# Patient Record
Sex: MALE | Race: BLACK OR AFRICAN AMERICAN
[De-identification: names, ages, dates, MRNs, and addresses within clinical notes are randomized per-mention and may not be internally consistent; named-entity substitution may affect disease eponyms.]

---

## 2018-08-27 ENCOUNTER — HOSPITAL ENCOUNTER (OUTPATIENT)
Dept: HOSPITAL 62 - RAD | Age: 63
End: 2018-08-27
Attending: ORTHOPAEDIC SURGERY
Payer: MEDICARE

## 2018-08-27 DIAGNOSIS — M25.511: Primary | ICD-10-CM

## 2018-08-27 DIAGNOSIS — M75.111: ICD-10-CM

## 2018-08-27 NOTE — RADIOLOGY REPORT (SQ)
EXAM DESCRIPTION:  MRI RT UPPER JOINT WITHOUT



COMPLETED DATE/TIME:  8/27/2018 8:39 am



REASON FOR STUDY:  PAIN IN R SHOULDER M25.511  PAIN IN RIGHT SHOULDER



COMPARISON:  None.



TECHNIQUE:  Right shoulder images acquired and stored on PACS. Multiplanar imaging to include fat sen
sitive sequences such as T1, water sensitive sequences such as FST2/STIR, cartilage sensitive sequenc
es such as FSPD/gradient-echo sequences.



LIMITATIONS:  None.



FINDINGS:  BONE MARROW AND CORTEX: No worrisome bone lesions or marrow replacement. No occult fractur
es.

JOINT OR BURSAL EFFUSION: No significant joint or bursal fluid.  No suggestion of loose bodies.

GLENO-HUMERAL ARTICULATION: Intact.  Central cartilage loss.

ACROMION AND AC JOINT: Type 2.  Moderate AC joint arthropathy.

ROTATOR CUFF AND INTERVAL: Diffuse thickening and heterogeneous increased T2 signal supraspinatus and
 infraspinatus.  More focal high signal supraspinatus tendon approaching full thickness, partial widt
h articular surface.  Small rim rent tear posterior supraspinatus.  More chronic appearing intrasubst
ance tear supraspinatus musculotendinous junction.

Mild fibrosis in the rotator interval.

 LABRUM  AND BICEPS LABRAL COMPLEX: Fraying of the superior labrum.  Intact biceps anchor.  Distal bi
ceps normal.

REMAINDER OF LABRUM AND IGHL : Intact.

PERIARTICULAR AND ADJACENT SOFT TISSUES: Small varices suprascapular notch.

OTHER: No other significant finding.



IMPRESSION:

1. Tendinosis supraspinatus and infraspinatus.  Partial width full-thickness tear of the supraspinatu
s.

2. AC joint arthropathy with narrowing of the subacromial space and distal acromial spurs.

3. Glenohumeral joint arthropathy.



TECHNICAL DOCUMENTATION:  JOB ID:  [id number]

 2011 [employer]- All Rights Reserved



Reading location - IP/workstation name: [email or web address]

## 2019-09-27 ENCOUNTER — HOSPITAL ENCOUNTER (OUTPATIENT)
Dept: HOSPITAL 62 - RAD | Age: 64
End: 2019-09-27
Attending: PHYSICIAN ASSISTANT
Payer: MEDICARE

## 2019-09-27 DIAGNOSIS — M48.061: ICD-10-CM

## 2019-09-27 DIAGNOSIS — M51.16: Primary | ICD-10-CM

## 2019-09-27 PROCEDURE — 72148 MRI LUMBAR SPINE W/O DYE: CPT

## 2019-09-27 NOTE — RADIOLOGY REPORT (SQ)
EXAM DESCRIPTION:  MRI LUMBAR SPINE WITHOUT



COMPLETED DATE/TIME:  9/27/2019 8:25 am



REASON FOR STUDY:  LUMBAR RADICULOPATHY (M54.16) M54.16  RADICULOPATHY, LUMBAR REGION



COMPARISON:  MRI lumbar spine 4/4/2012



TECHNIQUE:  Sagittal and Axial imaging includes T1, T2, STIR and gradient echo sequences. Coronal T2/
HASTE imaging.



LIMITATIONS:  Patient was unable to tolerate further scanning after sagittal images were obtained.  N
o axial images today.



FINDINGS:  SEGMENTATION: No transitional anatomy. The lowest well-developed disc space is labeled L5-
S1.

ALIGNMENT: Anatomic.

VERTEBRAE: Intact.

BONE MARROW: Normal. No marrow replacement or reactive changes.

DISC SIGNAL: Decreased T2 weighted intervertebral disc signal with disc space loss of height at L5-S1
 and L4-5

POSTERIOR ELEMENTS:  Generally intact.  No pars defect evident.

HARDWARE: None in the spine.

CORD AND CONUS: Normal in size and signal intensity. Conus at L1-2 appropriate level.

SOFT TISSUES: No aortic aneurysm seen. No bulky retroperitoneal adenopathy or mass. No paraspinal mas
s or fluid.

T12-L1:  No central or foraminal stenosis.  Mild bilateral facet arthropathy

L1-L2: Mild diffuse posterior disc bulging, mild bilateral facet and ligament hypertrophy.  Borderlin
e central canal narrowing.  Mild bilateral inferior foraminal narrowing without exiting L1 nerve root
 impingement

L2-L3: Minimal posterior disc bulging, bulky bilateral facet arthropathy.  Mild central canal narrowi
ng.  Mild right, moderate left foraminal narrowing without exiting L2 nerve root impingement

L3-L4: Broad diffuse posterior disc bulge, very bulky bilateral facet and ligament hypertrophy.  Ther
e is mild central canal narrowing and moderate bilateral foraminal narrowing.

L4-L5: Broad diffuse posterior bulge, bulky bilateral facet and ligament hypertrophy causes moderate 
central canal narrowing and moderate bilateral foraminal narrowing.

L5-S1: Broad diffuse posterior disc bulging, moderate bilateral facet hypertrophy.  No central stenos
is.  Mild bilateral foraminal narrowing.

SACRUM: Visualized upper sacrum intact.

OTHER: Please note that this is a limited study because is no axial MR images were obtained.  If ther
e is surgery planned, or if there is a need for more axial anatomic data,  consider follow-up CT with
out contrast.  Patient is claustrophobic for MRI.



IMPRESSION:  Limited study demonstrates multilevel central canal and foraminal narrowing



TECHNICAL DOCUMENTATION:  JOB ID:  [id number]

 2011 [employer]- All Rights Reserved



Reading location - IP/workstation name: [email or web address]

## 2020-01-24 ENCOUNTER — HOSPITAL ENCOUNTER (OUTPATIENT)
Dept: HOSPITAL 62 - RAD | Age: 65
End: 2020-01-24
Attending: FAMILY MEDICINE
Payer: MEDICARE

## 2020-01-24 DIAGNOSIS — R06.02: ICD-10-CM

## 2020-01-24 DIAGNOSIS — R13.10: Primary | ICD-10-CM

## 2020-01-24 PROCEDURE — 71046 X-RAY EXAM CHEST 2 VIEWS: CPT

## 2020-01-24 PROCEDURE — 74220 X-RAY XM ESOPHAGUS 1CNTRST: CPT

## 2020-01-24 NOTE — RADIOLOGY REPORT (SQ)
EXAM DESCRIPTION:  BARIUM SWALLOW ESOPHAGUS



COMPLETED DATE/TIME:  1/24/2020 8:22 am



REASON FOR STUDY:  DYSPHAGIA (R13.10) R13.10  DYSPHAGIA, UNSPECIFIED



COMPARISON:  None.



TECHNIQUE:  Under fluoroscopic guidance, patient ingested effervescent granules followed by thick and
 thin barium. Fluoroscopic spot images and routine radiographic images acquired and stored on PACS.

12 MM BARIUM TABLET GIVEN: Yes.

No significant delay in passage.



LIMITATIONS:  None.



FLUOROSCOPY TIME:  FLUORO TIME: 1 MINUTES 1 SECOND OF FLUOROSCOPY WAS USED.

9 images saved to PACS.



FINDINGS:  NEUROMUSCULAR COORDINATION OF SWALLOW: Normal. No aspiration.

ESOPHAGEAL MOTILITY: Normal peristalsis. No esophageal spasm.

ESOPHAGEAL MUCOSA: Normal mucosa without masses or ulceration.

GASTRO-ESOPHAGEAL JUNCTION: No hiatal hernia.  Mild gastroesophageal reflux.  12 mm barium tablet pas
sed through the GE junction without delay.

NON-GI TRACT STRUCTURES: No significant finding.

OTHER: No other significant finding.



IMPRESSION:  MILD GASTROESOPHAGEAL REFLUX.



COMMENT:  Quality :  Final reports for procedures using fluoroscopy that document radiation exp
osure indices, or exposure time and number of fluorographic images (if radiation exposure indices are
 not available)



TECHNICAL DOCUMENTATION:  JOB ID:  [id number]

 2011 [employer]- All Rights Reserved



Reading location - IP/workstation name: [email or web address] [id number]

## 2020-01-24 NOTE — RADIOLOGY REPORT (SQ)
EXAM DESCRIPTION:  CHEST 2 VIEWS



COMPLETED DATE/TIME:  1/24/2020 7:53 am



REASON FOR STUDY:  SOB (R06.02)



COMPARISON:  PA and lateral views of the chest from 11/19/2015.



EXAM PARAMETERS:  NUMBER OF VIEWS: two views

TECHNIQUE:  PA and lateral views of the chest were obtained.

RADIATION DOSE: NA

LIMITATIONS: none



FINDINGS:  LUNGS AND PLEURA: The left costophrenic sulcus is blunted.  There is no consolidation or p
neumothorax.

MEDIASTINUM AND HILAR STRUCTURES: No mediastinal or hilar contour abnormality.

HEART AND VASCULAR STRUCTURES: The cardiac silhouette and pulmonary vasculature are within normal holder
its.

BONES: No acute findings.

HARDWARE: None in the chest.

OTHER: No other finding.



IMPRESSION:  Small left pleural effusion.



TECHNICAL DOCUMENTATION:  JOB ID:  [id number]

 2011 [employer]- All Rights Reserved



Reading location - IP/workstation name: [email or web address]

## 2020-01-31 ENCOUNTER — HOSPITAL ENCOUNTER (OUTPATIENT)
Dept: HOSPITAL 62 - RAD | Age: 65
End: 2020-01-31
Attending: FAMILY MEDICINE
Payer: MEDICARE

## 2020-01-31 DIAGNOSIS — R16.0: ICD-10-CM

## 2020-01-31 DIAGNOSIS — R06.02: Primary | ICD-10-CM

## 2020-01-31 PROCEDURE — 71250 CT THORAX DX C-: CPT

## 2020-01-31 NOTE — RADIOLOGY REPORT (SQ)
EXAM DESCRIPTION:  CT CHEST WITHOUT



COMPLETED DATE/TIME:  1/31/2020 10:06 am



REASON FOR STUDY:  SHORTNESS OF BREATH R06.02  SHORTNESS OF BREATH



COMPARISON:  2/18/2015



TECHNIQUE:  CT scan performed of the chest without intravenous contrast.  Images reviewed with lung, 
soft tissue and bone windows.  Reconstructed coronal and sagittal MPR images reviewed.  All images st
ored on PACS.

All CT scanners at this facility use dose modulation, iterative reconstruction, and/or weight based d
osing when appropriate to reduce radiation dose to as low as reasonably achievable (ALARA).

[facility]: Dose Right  [facility]: CareDose    [facility]: Dose Right    [facility]: Teradose 4D    [facility]: Smart Boxfish



RADIATION DOSE:  CT Rad equipment meets quality standard of care and radiation dose reduction techniq
ues were employed. CTDIvol: 19.5 mGy. DLP: 770 mGy-cm. mGy.



LIMITATIONS:  No technical limitations.



FINDINGS:  LUNGS AND PLEURA: No masses, infiltrates, or pneumothorax.  No pleural effusions or pleura
l calcifications.

HILAR AND MEDIASTINAL STRUCTURES: No identified masses or abnormal nodes.  No obvious aneurysm.

HEART AND VASCULAR STRUCTURES: No aneurysm.  No pericardial effusion.

UPPER ABDOMEN: There is an ill-defined low-density 15 mm nodule on the right lobe of the liver.  This
 could represent a cyst or hemangioma perhaps.

THYROID AND OTHER SOFT TISSUES: No masses.  No adenopathy.

BONES: No significant finding.

HARDWARE: None in the chest.

OTHER: No other significant findings.



IMPRESSION:  No acute findings in the thorax.  Low-density nodule in the right lobe of the liver.  Co
nsider ultrasound for further evaluation.



TECHNICAL DOCUMENTATION:  JOB ID:  [id number]

Quality ID # 436: Final reports with documentation of one or more dose reduction techniques (e.g., Au
tomated exposure control, adjustment of the mA and/or kV according to patient size, use of iterative 
reconstruction technique)

 2011 [employer]- All Rights Reserved



Reading location - IP/workstation name: [email or web address]

## 2020-11-19 ENCOUNTER — HOSPITAL ENCOUNTER (OUTPATIENT)
Dept: HOSPITAL 62 - RAD | Age: 65
End: 2020-11-19
Attending: FAMILY MEDICINE
Payer: MEDICARE

## 2020-11-19 DIAGNOSIS — N43.3: Primary | ICD-10-CM

## 2020-11-19 PROCEDURE — 76870 US EXAM SCROTUM: CPT

## 2020-11-19 NOTE — RADIOLOGY REPORT (SQ)
EXAM DESCRIPTION:  U/S SCROTUM W/O DOPPLER



IMAGES COMPLETED DATE/TIME:  11/19/2020 1:03 pm



REASON FOR STUDY:  (N43.3)HYDROCELE, UNSPECIFIED N43.3  HYDROCELE, UNSPECIFIED



COMPARISON:  None.



TECHNIQUE:  Static and realtime gray scale imaging of the scrotum and testes.  Selected color Doppler
 and spectral images recorded to document blood flow.



LIMITATIONS:  None.



FINDINGS:  RIGHT:

TESTICLE: Normal size. Normal echotexture.  Normal blood flow.  No mass.

EPIDIDYMIS: 7 mm cyst.

HYDROCELE OR VARICOCELE: Small varicocele.

HERNIA OR EXTRA-TESTICULAR MASS: No.

OTHER: No other significant finding.

LEFT:

TESTICLE: Normal size. Normal echotexture.  Normal blood flow.  No mass.

EPIDIDYMIS: Normal.

HYDROCELE OR VARICOCELE: Small varicocele.

HERNIA OR EXTRA-TESTICULAR MASS: No.

OTHER: No other significant finding.



IMPRESSION:  No hydrocele.  Small varicoceles.



TECHNICAL DOCUMENTATION:  JOB ID:  [id number]

 2011 [employer]- All Rights Reserved



Reading location - IP/workstation name: [email or web address]